# Patient Record
Sex: FEMALE | Race: WHITE | NOT HISPANIC OR LATINO | Employment: FULL TIME | URBAN - METROPOLITAN AREA
[De-identification: names, ages, dates, MRNs, and addresses within clinical notes are randomized per-mention and may not be internally consistent; named-entity substitution may affect disease eponyms.]

---

## 2017-04-17 ENCOUNTER — HOSPITAL ENCOUNTER (OUTPATIENT)
Dept: MAMMOGRAPHY | Facility: CLINIC | Age: 52
Discharge: HOME | End: 2017-04-17
Attending: FAMILY MEDICINE

## 2017-04-17 DIAGNOSIS — Z12.31 VISIT FOR SCREENING MAMMOGRAM: ICD-10-CM

## 2017-04-20 ENCOUNTER — OFFICE VISIT - HEALTHEAST (OUTPATIENT)
Dept: FAMILY MEDICINE | Facility: CLINIC | Age: 52
End: 2017-04-20

## 2017-04-20 DIAGNOSIS — E66.9 OBESITY, UNSPECIFIED: ICD-10-CM

## 2017-04-20 DIAGNOSIS — F41.9 ANXIETY: ICD-10-CM

## 2017-04-20 DIAGNOSIS — I10 HTN (HYPERTENSION): ICD-10-CM

## 2017-04-20 LAB — HBA1C MFR BLD: 5.6 % (ref 3.5–6.1)

## 2017-04-24 ENCOUNTER — COMMUNICATION - HEALTHEAST (OUTPATIENT)
Dept: FAMILY MEDICINE | Facility: CLINIC | Age: 52
End: 2017-04-24

## 2017-05-04 ENCOUNTER — AMBULATORY - HEALTHEAST (OUTPATIENT)
Dept: NURSING | Facility: CLINIC | Age: 52
End: 2017-05-04

## 2017-06-01 ENCOUNTER — AMBULATORY - HEALTHEAST (OUTPATIENT)
Dept: NURSING | Facility: CLINIC | Age: 52
End: 2017-06-01

## 2017-06-01 DIAGNOSIS — I10 HTN (HYPERTENSION): ICD-10-CM

## 2017-06-01 DIAGNOSIS — E66.9 OBESITY, UNSPECIFIED: ICD-10-CM

## 2017-06-29 ENCOUNTER — AMBULATORY - HEALTHEAST (OUTPATIENT)
Dept: NURSING | Facility: CLINIC | Age: 52
End: 2017-06-29

## 2017-07-27 ENCOUNTER — AMBULATORY - HEALTHEAST (OUTPATIENT)
Dept: NURSING | Facility: CLINIC | Age: 52
End: 2017-07-27

## 2017-08-31 ENCOUNTER — AMBULATORY - HEALTHEAST (OUTPATIENT)
Dept: NURSING | Facility: CLINIC | Age: 52
End: 2017-08-31

## 2017-12-20 ENCOUNTER — COMMUNICATION - HEALTHEAST (OUTPATIENT)
Dept: FAMILY MEDICINE | Facility: CLINIC | Age: 52
End: 2017-12-20

## 2017-12-20 DIAGNOSIS — I10 HTN (HYPERTENSION): ICD-10-CM

## 2017-12-20 DIAGNOSIS — E66.9 OBESITY: ICD-10-CM

## 2018-03-23 ENCOUNTER — COMMUNICATION - HEALTHEAST (OUTPATIENT)
Dept: FAMILY MEDICINE | Facility: CLINIC | Age: 53
End: 2018-03-23

## 2018-03-23 DIAGNOSIS — I10 HTN (HYPERTENSION): ICD-10-CM

## 2018-03-23 DIAGNOSIS — E66.9 OBESITY: ICD-10-CM

## 2018-04-05 ENCOUNTER — COMMUNICATION - HEALTHEAST (OUTPATIENT)
Dept: TELEHEALTH | Facility: CLINIC | Age: 53
End: 2018-04-05

## 2018-04-05 ENCOUNTER — OFFICE VISIT - HEALTHEAST (OUTPATIENT)
Dept: FAMILY MEDICINE | Facility: CLINIC | Age: 53
End: 2018-04-05

## 2018-04-05 DIAGNOSIS — I10 HTN (HYPERTENSION): ICD-10-CM

## 2018-04-05 DIAGNOSIS — R20.2 PARESTHESIA: ICD-10-CM

## 2018-04-05 DIAGNOSIS — Z00.00 ROUTINE GENERAL MEDICAL EXAMINATION AT A HEALTH CARE FACILITY: ICD-10-CM

## 2018-04-05 LAB
ALBUMIN SERPL-MCNC: 3.6 G/DL (ref 3.5–5)
ALP SERPL-CCNC: 92 U/L (ref 45–120)
ALT SERPL W P-5'-P-CCNC: 13 U/L (ref 0–45)
ANION GAP SERPL CALCULATED.3IONS-SCNC: 13 MMOL/L (ref 5–18)
AST SERPL W P-5'-P-CCNC: 14 U/L (ref 0–40)
BILIRUB SERPL-MCNC: 0.3 MG/DL (ref 0–1)
BUN SERPL-MCNC: 14 MG/DL (ref 8–22)
CALCIUM SERPL-MCNC: 10 MG/DL (ref 8.5–10.5)
CHLORIDE BLD-SCNC: 103 MMOL/L (ref 98–107)
CHOLEST SERPL-MCNC: 191 MG/DL
CO2 SERPL-SCNC: 24 MMOL/L (ref 22–31)
CREAT SERPL-MCNC: 0.77 MG/DL (ref 0.6–1.1)
FASTING STATUS PATIENT QL REPORTED: YES
GFR SERPL CREATININE-BSD FRML MDRD: >60 ML/MIN/1.73M2
GLUCOSE BLD-MCNC: 81 MG/DL (ref 70–125)
HDLC SERPL-MCNC: 65 MG/DL
LDLC SERPL CALC-MCNC: 113 MG/DL
POTASSIUM BLD-SCNC: 3.9 MMOL/L (ref 3.5–5)
PROT SERPL-MCNC: 7.5 G/DL (ref 6–8)
SODIUM SERPL-SCNC: 140 MMOL/L (ref 136–145)
TRIGL SERPL-MCNC: 63 MG/DL
TSH SERPL DL<=0.005 MIU/L-ACNC: 1.16 UIU/ML (ref 0.3–5)
VIT B12 SERPL-MCNC: 526 PG/ML (ref 213–816)

## 2018-04-05 ASSESSMENT — MIFFLIN-ST. JEOR: SCORE: 1680.64

## 2018-04-23 ENCOUNTER — HOSPITAL ENCOUNTER (OUTPATIENT)
Dept: MAMMOGRAPHY | Facility: CLINIC | Age: 53
Discharge: HOME | End: 2018-04-23
Attending: FAMILY MEDICINE

## 2018-04-23 DIAGNOSIS — Z12.31 VISIT FOR SCREENING MAMMOGRAM: ICD-10-CM

## 2018-07-23 ENCOUNTER — COMMUNICATION - HEALTHEAST (OUTPATIENT)
Dept: FAMILY MEDICINE | Facility: CLINIC | Age: 53
End: 2018-07-23

## 2018-07-23 DIAGNOSIS — E66.9 OBESITY: ICD-10-CM

## 2018-07-23 DIAGNOSIS — I10 HTN (HYPERTENSION): ICD-10-CM

## 2018-10-04 ENCOUNTER — AMBULATORY - HEALTHEAST (OUTPATIENT)
Dept: NURSING | Facility: CLINIC | Age: 53
End: 2018-10-04

## 2018-10-08 ENCOUNTER — COMMUNICATION - HEALTHEAST (OUTPATIENT)
Dept: FAMILY MEDICINE | Facility: CLINIC | Age: 53
End: 2018-10-08

## 2018-10-08 DIAGNOSIS — I10 HTN (HYPERTENSION): ICD-10-CM

## 2018-10-08 DIAGNOSIS — E66.9 OBESITY: ICD-10-CM

## 2019-04-09 ENCOUNTER — OFFICE VISIT - HEALTHEAST (OUTPATIENT)
Dept: FAMILY MEDICINE | Facility: CLINIC | Age: 54
End: 2019-04-09

## 2019-04-09 DIAGNOSIS — N92.6 IRREGULAR MENSES: ICD-10-CM

## 2019-04-09 DIAGNOSIS — R53.83 FATIGUE, UNSPECIFIED TYPE: ICD-10-CM

## 2019-04-09 DIAGNOSIS — I10 ESSENTIAL HYPERTENSION: ICD-10-CM

## 2019-04-09 DIAGNOSIS — Z00.00 ROUTINE GENERAL MEDICAL EXAMINATION AT A HEALTH CARE FACILITY: ICD-10-CM

## 2019-04-09 LAB
ALBUMIN SERPL-MCNC: 3.9 G/DL (ref 3.5–5)
ALP SERPL-CCNC: 74 U/L (ref 45–120)
ALT SERPL W P-5'-P-CCNC: 21 U/L (ref 0–45)
ANION GAP SERPL CALCULATED.3IONS-SCNC: 13 MMOL/L (ref 5–18)
AST SERPL W P-5'-P-CCNC: 21 U/L (ref 0–40)
BILIRUB SERPL-MCNC: 0.3 MG/DL (ref 0–1)
BUN SERPL-MCNC: 14 MG/DL (ref 8–22)
CALCIUM SERPL-MCNC: 10 MG/DL (ref 8.5–10.5)
CHLORIDE BLD-SCNC: 105 MMOL/L (ref 98–107)
CHOLEST SERPL-MCNC: 234 MG/DL
CO2 SERPL-SCNC: 23 MMOL/L (ref 22–31)
CREAT SERPL-MCNC: 0.71 MG/DL (ref 0.6–1.1)
ERYTHROCYTE [DISTWIDTH] IN BLOOD BY AUTOMATED COUNT: 13.3 % (ref 11–14.5)
FASTING STATUS PATIENT QL REPORTED: YES
GFR SERPL CREATININE-BSD FRML MDRD: >60 ML/MIN/1.73M2
GLUCOSE BLD-MCNC: 81 MG/DL (ref 70–125)
HCT VFR BLD AUTO: 45.4 % (ref 35–47)
HDLC SERPL-MCNC: 85 MG/DL
HGB BLD-MCNC: 15.1 G/DL (ref 12–16)
LDLC SERPL CALC-MCNC: 135 MG/DL
MCH RBC QN AUTO: 28.7 PG (ref 27–34)
MCHC RBC AUTO-ENTMCNC: 33.3 G/DL (ref 32–36)
MCV RBC AUTO: 86 FL (ref 80–100)
PLATELET # BLD AUTO: 300 THOU/UL (ref 140–440)
PMV BLD AUTO: 7.2 FL (ref 7–10)
POTASSIUM BLD-SCNC: 4.1 MMOL/L (ref 3.5–5)
PROT SERPL-MCNC: 7.2 G/DL (ref 6–8)
RBC # BLD AUTO: 5.26 MILL/UL (ref 3.8–5.4)
SODIUM SERPL-SCNC: 141 MMOL/L (ref 136–145)
T4 FREE SERPL-MCNC: 0.9 NG/DL (ref 0.7–1.8)
TRIGL SERPL-MCNC: 70 MG/DL
TSH SERPL DL<=0.005 MIU/L-ACNC: 1.58 UIU/ML (ref 0.3–5)
WBC: 6.1 THOU/UL (ref 4–11)

## 2019-04-09 ASSESSMENT — MIFFLIN-ST. JEOR: SCORE: 1562.71

## 2019-04-10 LAB
25(OH)D3 SERPL-MCNC: 43 NG/ML (ref 30–80)
HPV SOURCE: NORMAL
HUMAN PAPILLOMA VIRUS 16 DNA: NEGATIVE
HUMAN PAPILLOMA VIRUS 18 DNA: NEGATIVE
HUMAN PAPILLOMA VIRUS FINAL DIAGNOSIS: NORMAL
HUMAN PAPILLOMA VIRUS OTHER HR: NEGATIVE
SPECIMEN DESCRIPTION: NORMAL

## 2019-04-15 ENCOUNTER — COMMUNICATION - HEALTHEAST (OUTPATIENT)
Dept: FAMILY MEDICINE | Facility: CLINIC | Age: 54
End: 2019-04-15

## 2019-04-15 LAB
BKR LAB AP ABNORMAL BLEEDING: NO
BKR LAB AP BIRTH CONTROL/HORMONES: NORMAL
BKR LAB AP CERVICAL APPEARANCE: NORMAL
BKR LAB AP GYN ADEQUACY: NORMAL
BKR LAB AP GYN INTERPRETATION: NORMAL
BKR LAB AP HPV REFLEX: NORMAL
BKR LAB AP LMP: NORMAL
BKR LAB AP PATIENT STATUS: NORMAL
BKR LAB AP PREVIOUS ABNORMAL: NORMAL
BKR LAB AP PREVIOUS NORMAL: 2014
HIGH RISK?: NO
PATH REPORT.COMMENTS IMP SPEC: NORMAL
RESULT FLAG (HE HISTORICAL CONVERSION): NORMAL

## 2019-05-06 ENCOUNTER — HOSPITAL ENCOUNTER (OUTPATIENT)
Dept: ULTRASOUND IMAGING | Facility: CLINIC | Age: 54
Discharge: HOME | End: 2019-05-06
Attending: FAMILY MEDICINE

## 2019-05-06 ENCOUNTER — HOSPITAL ENCOUNTER (OUTPATIENT)
Dept: MAMMOGRAPHY | Facility: CLINIC | Age: 54
Discharge: HOME | End: 2019-05-06
Attending: FAMILY MEDICINE

## 2019-05-06 DIAGNOSIS — Z12.31 VISIT FOR SCREENING MAMMOGRAM: ICD-10-CM

## 2019-05-06 DIAGNOSIS — N92.6 IRREGULAR MENSES: ICD-10-CM

## 2019-05-07 ENCOUNTER — COMMUNICATION - HEALTHEAST (OUTPATIENT)
Dept: FAMILY MEDICINE | Facility: CLINIC | Age: 54
End: 2019-05-07

## 2019-05-09 ENCOUNTER — OFFICE VISIT - HEALTHEAST (OUTPATIENT)
Dept: FAMILY MEDICINE | Facility: CLINIC | Age: 54
End: 2019-05-09

## 2019-05-09 DIAGNOSIS — E66.01 MORBID OBESITY (H): ICD-10-CM

## 2019-05-09 DIAGNOSIS — I10 ESSENTIAL HYPERTENSION: ICD-10-CM

## 2019-10-09 ENCOUNTER — RECORDS - HEALTHEAST (OUTPATIENT)
Dept: ADMINISTRATIVE | Facility: OTHER | Age: 54
End: 2019-10-09

## 2020-03-05 ENCOUNTER — HOSPITAL ENCOUNTER (OUTPATIENT)
Dept: MAMMOGRAPHY | Facility: CLINIC | Age: 55
Discharge: HOME | End: 2020-03-05
Attending: FAMILY MEDICINE

## 2020-03-05 DIAGNOSIS — Z12.31 VISIT FOR SCREENING MAMMOGRAM: ICD-10-CM

## 2021-01-20 ENCOUNTER — OFFICE VISIT - HEALTHEAST (OUTPATIENT)
Dept: FAMILY MEDICINE | Facility: CLINIC | Age: 56
End: 2021-01-20

## 2021-01-20 DIAGNOSIS — I10 ESSENTIAL HYPERTENSION: ICD-10-CM

## 2021-01-20 RX ORDER — HYDROCHLOROTHIAZIDE 12.5 MG/1
CAPSULE ORAL
Qty: 90 CAPSULE | Refills: 3 | Status: SHIPPED | OUTPATIENT
Start: 2021-01-20 | End: 2021-11-03

## 2021-03-26 ENCOUNTER — OFFICE VISIT - HEALTHEAST (OUTPATIENT)
Dept: FAMILY MEDICINE | Facility: CLINIC | Age: 56
End: 2021-03-26

## 2021-03-26 DIAGNOSIS — R05.9 COUGH: ICD-10-CM

## 2021-03-26 DIAGNOSIS — R09.82 POST-NASAL DRIP: ICD-10-CM

## 2021-03-26 RX ORDER — BENZONATATE 100 MG/1
200 CAPSULE ORAL 3 TIMES DAILY PRN
Qty: 30 CAPSULE | Refills: 0 | Status: SHIPPED | OUTPATIENT
Start: 2021-03-26 | End: 2021-11-03

## 2021-03-26 ASSESSMENT — MIFFLIN-ST. JEOR: SCORE: 1756.17

## 2021-04-22 ENCOUNTER — HOSPITAL ENCOUNTER (OUTPATIENT)
Dept: MAMMOGRAPHY | Facility: CLINIC | Age: 56
Discharge: HOME | End: 2021-04-22
Attending: FAMILY MEDICINE

## 2021-04-22 DIAGNOSIS — Z12.31 VISIT FOR SCREENING MAMMOGRAM: ICD-10-CM

## 2021-05-26 ENCOUNTER — RECORDS - HEALTHEAST (OUTPATIENT)
Dept: ADMINISTRATIVE | Facility: CLINIC | Age: 56
End: 2021-05-26

## 2021-05-28 ENCOUNTER — RECORDS - HEALTHEAST (OUTPATIENT)
Dept: ADMINISTRATIVE | Facility: CLINIC | Age: 56
End: 2021-05-28

## 2021-05-28 NOTE — TELEPHONE ENCOUNTER
Left message to call back for: Geetha  Information to relay to patient:      ----- Message from Tom Mendoza MD sent at 5/7/2019  9:22 AM CDT -----  Please inform the patient that pelvic ultrasound was normal.  Thank you

## 2021-05-28 NOTE — TELEPHONE ENCOUNTER
Patient Returning Call  Reason for call:  Message from clinic  Information relayed to patient: Message from Tom Mendoza MD sent at 5/7/2019  9:22 AM CDT -----  Please inform the patient that pelvic ultrasound was normal.  Thank you  Patient has additional questions:  No  If YES, what are your questions/concerns:  n/a  Okay to leave a detailed message?: No call back needed

## 2021-05-28 NOTE — PROGRESS NOTES
ASSESSMENT/PLAN:  1. Essential hypertension  Start lisinopril 10mg.  She will come back for a nurse only BP check (because of co-pay cost)  Low sodium, daily exercise, and healthy eating  F/u in the clinic in 6 months  - lisinopril (PRINIVIL,ZESTRIL) 10 MG tablet; Take 1 tablet (10 mg total) by mouth daily.  Dispense: 90 tablet; Refill: 0    2. Morbid obesity (H)  Counseled healthy lifestyle modifications     CHIEF COMPLAINT:  Chief Complaint   Patient presents with     Hypertension     BP check     discuss US and mammo       HISTORY OF PRESENT ILLNESS:  Geetha is a 54 y.o. female presenting to the clinic today for hypertension follow up. She discontinued HCTZ due to urinary frequency. She does not check her blood pressure at home. Her BP today is 140/78.     REVIEW OF SYSTEMS:   Constitutional: Negative.   HENT: Negative.   Eyes: Negative.   Respiratory: Negative.   Cardiovascular: Negative.   Gastrointestinal: Negative.   Endocrine: Negative.   Genitourinary: Negative.   Musculoskeletal: Negative.   Skin: Negative.   Allergic/Immunologic: Negative.   Neurological: Negative.   Hematological: Negative.   Psychiatric/Behavioral: Negative.   All other systems are negative.    PFSH:  Reviewed as below.     TOBACCO USE:  Social History     Tobacco Use   Smoking Status Never Smoker   Smokeless Tobacco Never Used       VITALS:  Vitals:    05/09/19 1028   BP: 140/78   Patient Site: Left Arm   Patient Position: Sitting   Cuff Size: Adult Regular   Pulse: 68   Weight: (!) 228 lb 1 oz (103.4 kg)     Wt Readings from Last 3 Encounters:   05/09/19 (!) 228 lb 1 oz (103.4 kg)   04/09/19 221 lb 4 oz (100.4 kg)   04/05/18 (!) 246 lb 6 oz (111.8 kg)       PHYSICAL EXAM:  Constitutional: Patient is oriented to person, place, and time. Patient appears well-developed and well-nourished. No distress.   Cardiovascular: Normal rate.  Pulmonary/Chest: Effort normal. No respiratory distress.   Neurological: Patient is alert and oriented to  person, place, and time.      DATA REVIEWED:  ADDITIONAL HISTORY SUMMARIZED (2): None.   DECISION TO OBTAIN EXTRA INFORMATION (1): None.   RADIOLOGY TESTS (1): None.  LABS (1): None.  MEDICINE TESTS (1): None.  INDEPENDENT REVIEW (2 each): None.       The visit lasted a total of 10 minutes face to face with the patient. Over 50% of the time was spent counseling and educating the patient about blood pressure recheck.     IKoki, am scribing for and in the presence of Dr. Browne.  ITom MD , personally performed the services described in this documentation, as scribed by Koki Guzman in my presence, and it is both accurate and complete.       MEDICATIONS:  Current Outpatient Medications   Medication Sig Dispense Refill     lisinopril (PRINIVIL,ZESTRIL) 10 MG tablet Take 1 tablet (10 mg total) by mouth daily. 90 tablet 0     No current facility-administered medications for this visit.         Total data points: 0

## 2021-05-29 ENCOUNTER — RECORDS - HEALTHEAST (OUTPATIENT)
Dept: ADMINISTRATIVE | Facility: CLINIC | Age: 56
End: 2021-05-29

## 2021-05-30 ENCOUNTER — RECORDS - HEALTHEAST (OUTPATIENT)
Dept: ADMINISTRATIVE | Facility: CLINIC | Age: 56
End: 2021-05-30

## 2021-05-30 VITALS — BODY MASS INDEX: 44.72 KG/M2 | WEIGHT: 252.44 LBS

## 2021-06-01 VITALS — BODY MASS INDEX: 43.66 KG/M2 | HEIGHT: 63 IN | WEIGHT: 246.38 LBS

## 2021-06-02 VITALS — WEIGHT: 221.25 LBS | HEIGHT: 63 IN | BODY MASS INDEX: 39.2 KG/M2

## 2021-06-03 VITALS — WEIGHT: 228.06 LBS | BODY MASS INDEX: 40.4 KG/M2

## 2021-06-05 VITALS
RESPIRATION RATE: 17 BRPM | SYSTOLIC BLOOD PRESSURE: 130 MMHG | OXYGEN SATURATION: 100 % | TEMPERATURE: 97.6 F | HEART RATE: 89 BPM | WEIGHT: 263.9 LBS | DIASTOLIC BLOOD PRESSURE: 80 MMHG | HEIGHT: 63 IN | BODY MASS INDEX: 46.76 KG/M2

## 2021-06-10 NOTE — PROGRESS NOTES
ASSESSMENT/PLAN:  1. HTN (hypertension)  52-year-old female with a new diagnosis of hypertension.  I will start her on hydrochlorothiazide 12.5 mg.  We spoke about side effects of hydrochlorothiazide.  We also spoke about complications of uncontrolled hypertension.  I counseled her on lifestyle modification, sodium restriction, weight reduction, physical activity.  She will come back in 2 weeks for nurse only blood pressure check.  - Comprehensive Metabolic Panel  - HM2(CBC w/o Differential)  - Glycosylated Hemoglobin A1c  - Thyroid Stimulating Hormone (TSH)  - hydroCHLOROthiazide (MICROZIDE) 12.5 mg capsule; Take 1 capsule (12.5 mg total) by mouth daily.  Dispense: 30 capsule; Refill: 1    2. Obesity  Counseled    3. Anxiety  Not currently on medication at this point.  We briefly spoke about this.  She will continue with coping mechanism.  She may benefit from SSRI and psychotherapy.        Orders Placed This Encounter   Procedures     Comprehensive Metabolic Panel     HM2(CBC w/o Differential)     Glycosylated Hemoglobin A1c     Thyroid Stimulating Hormone (TSH)           CHIEF COMPLAINT:  Chief Complaint   Patient presents with     BP check     High BP today from 150/90       HISTORY OF PRESENT ILLNESS:  Geetha is a 52 y.o. female presenting to the clinic today for concerns for high blood pressure. Here with her son's girlfriend. Her blood pressure today is elevated today at 158/88. She has never been told that she has blood pressure. 2 weeks ago, she went into the [facility] because she was coughing and had some associated chest pain from her cough. Her blood pressure at the [facility] was very elevated with systolic of 195. She received Tessalon Perles and albuterol for her cough. She was told to follow up with her regular doctor when she was feeling better.    She does have a wrist cuff to check her pressure, and she checked it today, and it was high. She does say that she is somewhat anxious, and  this could be driving her blood pressure higher. She also feels like weight and lifestyle have been contributing to her blood pressure because she has gain weight in the past 6 months. 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 She feels like she has been having some anxiety related chest discomfort.     Health Maintenance: She recently had her mammogram. Her colonoscopy is up to date.     REVIEW OF SYSTEMS:   She has been using some essential oils for vertigo. Denies constipation. All other systems are negative.    PFSH:  Family history of son with hypertension. Family history of thyroid dysfunction in mother. No family history of diabetes.     TOBACCO USE:  History   Smoking Status     Never Smoker   Smokeless Tobacco     Not on file       VITALS:  Vitals:    04/20/17 1530 04/20/17 1608   BP: 156/88 150/90   Patient Site: Left Arm    Patient Position: Sitting    Cuff Size: Adult Large    Pulse: 88    Weight: (!) 252 lb 7 oz (114.5 kg)      Wt Readings from Last 3 Encounters:   04/20/17 (!) 252 lb 7 oz (114.5 kg)   04/23/15 (!) 224 lb 2 oz (101.7 kg)       PHYSICAL EXAM:  Constitutional: Patient is oriented to person, place, and time. Patient appears well-developed and well-nourished. No distress.   Cardiovascular: Normal rate, regular rhythm, normal heart sounds and intact distal pulses. No murmur heard.   Pulmonary/Chest: Effort normal and breath sounds normal. No stridor. No respiratory distress. Patient has no wheezes, no rales, exhibits no tenderness.    Neurological: Patient is alert and oriented to person, place, and time. Patient has normal reflexes. No cranial nerve deficit. Coordination normal.   Skin: Skin is warm and dry. No rash noted. Patient is not diaphoretic. No erythema. No pallor.        Results for orders placed or performed in visit on 04/20/17   HM2(CBC w/o Differential)   Result Value Ref Range    WBC 9.0 4.0 - 11.0 thou/uL    RBC 5.11 3.80 -  5.40 mill/uL    Hemoglobin 14.5 12.0 - 16.0 g/dL    Hematocrit 43.2 35.0 - 47.0 %    MCV 84 80 - 100 fL    MCH 28.3 27.0 - 34.0 pg    MCHC 33.5 32.0 - 36.0 g/dL    RDW 13.1 11.0 - 14.5 %    Platelets 336 140 - 440 thou/uL    MPV 6.9 (L) 7.0 - 10.0 fL   Glycosylated Hemoglobin A1c   Result Value Ref Range    Hemoglobin A1c 5.6 3.5 - 6.1 %       QUALITY MEASURES:  The following high BMI interventions were performed this visit: encouragement to exercise and lifestyle education regarding diet    ADDITIONAL HISTORY SUMMARIZED (2): None.  DECISION TO OBTAIN EXTRA INFORMATION (1): None.   RADIOLOGY TESTS (1): None.  LABS (1): Ordered labs today.  MEDICINE TESTS (1): None.  INDEPENDENT REVIEW (2 each): None.     The visit lasted a total of 25 minutes face to face with the patient. Over 50% of the time was spent counseling and educating the patient about hypertension management.    IMicki, am scribing for and in the presence of, Dr. Browne.    IDr. Browne, personally performed the services described in this documentation, as scribed by Micki Nixon in my presence, and it is both accurate and complete.    MEDICATIONS:  Current Outpatient Prescriptions   Medication Sig Dispense Refill     hydroCHLOROthiazide (MICROZIDE) 12.5 mg capsule Take 1 capsule (12.5 mg total) by mouth daily. 30 capsule 1     No current facility-administered medications for this visit.        Total data points: 1

## 2021-06-10 NOTE — PROGRESS NOTES
I met with Geetha Connolly at the request of Dr Browne to recheck her blood pressure.  Blood pressure medications on the MAR were reviewed with patient.    Patient has taken all medications as per usual regimen: Yes  Patient reports tolerating them without any issues or concerns: Yes    Vitals:    05/04/17 1028 05/04/17 1039   BP: 140/90 136/88   Patient Site: Left Arm Left Arm   Patient Position: Sitting Sitting   Cuff Size: Adult Large Adult Large   Pulse: 88 72       After 5 minutes, the patient's blood pressure remained > 140/90.    Is the patient currently having any chest pain?  No  Does the patient currently have a headache?   No  Does the patient currently have any vision changes? No  Does the patient currently have any nausea? No  Does the patient currently have any abdominal pain? No    Information was reported to Dr Browne.

## 2021-06-14 NOTE — PROGRESS NOTES
Geetha Connolly is a 55 y.o. female who is being evaluated via a billable video visit.      How would you like to obtain your AVS? MyChart.    Will anyone else be joining your video visit? No      Video Start Time: 10:33 AM  Assessment & Plan     Geetha was seen today for med check and medication refill.    Diagnoses and all orders for this visit:    Essential hypertension  -     hydroCHLOROthiazide (MICROZIDE) 12.5 mg capsule; TK 1 C PO D  Refilled  Counseled healthy lifestyle modifications  F/u yearly      Yaniraua Dianne Mendoza MD  [facility]    Subjective     Geetha Connolly is 55 y.o. and presents to clinic today for the following health issues   HPI     Here for PB check  128/77 this morning  No chest pain or shortness of breath  Afraid to come to the clinic because of fear of getting COVID19  Overall doing well        Objective       Vitals:  No vitals were obtained today due to virtual visit.    Physical Exam  Constitutional: Patient is oriented to person, place, and time. Patient appears well-developed and well-nourished. No distress.   Head: Normocephalic and atraumatic.   Right Ear: External ear normal.   Left Ear: External ear normal.   Nose: Nose normal.   Eyes: Conjunctivae and EOM are normal. Right eye exhibits no discharge. Left eye exhibits no discharge. No scleral icterus.   Neurological: Patient is alert and oriented to person, place, and time. No cranial nerve deficit. Coordination normal.   Skin: No rash noted. Patient is not diaphoretic. No erythema. No pallor.     Video-Visit Details    Type of service:  Video Visit    Video End Time (time video stopped): 10:34 AM  Originating Location (pt. Location): Home    Distant Location (provider location):  [facility]     Platform used for Video Visit: [employer]

## 2021-06-16 PROBLEM — I10 HTN (HYPERTENSION): Status: ACTIVE | Noted: 2018-04-05

## 2021-06-16 PROBLEM — E66.01 MORBID OBESITY (H): Status: ACTIVE | Noted: 2019-05-09

## 2021-06-16 NOTE — PROGRESS NOTES
ASSESSMENT:  1. Cough  - benzonatate (TESSALON PERLES) 100 MG capsule; Take 2 capsules (200 mg total) by mouth 3 (three) times a day as needed for cough.  Dispense: 30 capsule; Refill: 0    2. Post-nasal drip    PLAN / MDM:  I think that she does have a postnasal drainage.  I did not really notice any major abnormal findings on her exam.  We will have to cover treated with symptom control medications and have her follow-up if not improved.    No orders of the defined types were placed in this encounter.    There are no discontinued medications.    No follow-ups on file.      CHIEF COMPLAINT:  Chief Complaint   Patient presents with     Cough     chest pain with cough and SOB       HISTORY OF PRESENT ILLNESS:  Geetha is a 56 y.o. female presenting to the clinic today she has been having cough that was suggested about a week ago.  Cough is said to be slightly worse in the morning and also productive.  She noted mild cough throughout the day increase she is unable to produce a lot of phlegm.  She feels some pressure at the middle of her upper chest.  Cough is also worse when she is laying down in a couch relaxing.  She noted that when she is sleeping it is not problematic for her because she uses a CPAP machine.  She has had no fevers, she has had no chills.  She has some runny nose, no ear pain and feeling slightly sore throat.  She has no prior exposure to somebody with any URI.        REVIEW OF SYSTEMS:   She does not wheeze.  She does not have any difficulty breathing.  Rest of the review of systems as in the history.  All other systems are negative.    PFSH:  Reviewed, as below.    Social History     Tobacco Use   Smoking Status Never Smoker   Smokeless Tobacco Never Used       Family History   Problem Relation Age of Onset     Breast cancer Mother 70       Social History     Socioeconomic History     Marital status: Single     Spouse name: Not on file     Number of children: Not on file     Years of education: Not  on file     Highest education level: Not on file   Occupational History     Not on file   Social Needs     Financial resource strain: Not on file     Food insecurity     Worry: Not on file     Inability: Not on file     Transportation needs     Medical: Not on file     Non-medical: Not on file   Tobacco Use     Smoking status: Never Smoker     Smokeless tobacco: Never Used   Substance and Sexual Activity     Alcohol use: Not on file     Drug use: Not on file     Sexual activity: Not on file   Lifestyle     Physical activity     Days per week: Not on file     Minutes per session: Not on file     Stress: Not on file   Relationships     Social connections     Talks on phone: Not on file     Gets together: Not on file     Attends [employer] service: Not on file     Active member of club or organization: Not on file     Attends meetings of clubs or organizations: Not on file     Relationship status: Not on file     Intimate partner violence     Fear of current or ex partner: Not on file     Emotionally abused: Not on file     Physically abused: Not on file     Forced sexual activity: Not on file   Other Topics Concern     Not on file   Social History Narrative     Not on file       Past Surgical History:   Procedure Laterality Date     ID REMOVAL GALLBLADDER      Description: Cholecystectomy;  Recorded: 03/23/2011;  Comments: ~2003       No Known Allergies    Active Ambulatory Problems     Diagnosis Date Noted     HTN (hypertension) 04/05/2018     Morbid obesity (H) 05/09/2019     Resolved Ambulatory Problems     Diagnosis Date Noted     No Resolved Ambulatory Problems     No Additional Past Medical History       Current Outpatient Medications   Medication Sig Dispense Refill     CALCIUM-MAGNESIUM ORAL        cholecalciferol, vitamin D3, 5,000 unit Tab Take by mouth.       hydroCHLOROthiazide (MICROZIDE) 12.5 mg capsule TK 1 C PO D 90 capsule 3     OMEGA-3 FATTY ACIDS-FISH OIL ORAL Take by mouth.       benzonatate  "(TESSALON PERLES) 100 MG capsule Take 2 capsules (200 mg total) by mouth 3 (three) times a day as needed for cough. 30 capsule 0     No current facility-administered medications for this visit.        VITALS:  Vitals:    03/26/21 1414   BP: 130/80   Patient Site: Left Arm   Patient Position: Sitting   Cuff Size: Adult Large   Pulse: 89   Resp: 17   Temp: 97.6  F (36.4  C)   TempSrc: Oral   SpO2: 100%   Weight: (!) 263 lb 14.4 oz (119.7 kg)   Height: 5' 3\" (1.6 m)     Wt Readings from Last 3 Encounters:   03/26/21 (!) 263 lb 14.4 oz (119.7 kg)   10/06/19 215 lb (97.5 kg)   05/09/19 (!) 228 lb 1 oz (103.4 kg)     Body mass index is 46.75 kg/m .    PHYSICAL EXAM:  General Appearance: Alert, cooperative, no distress, appears stated age  HEENT: Pupils are equal and reactive, extraocular motions is normal.  Oropharynx is moist.  Mild postnasal drainage.  Neck is supple no notable thyromegaly no lymphadenopathy..  External ears are normal.  Tympanic membrane was normal.  Lungs: Clear to auscultation bilaterally, respirations unlabored  Heart: Regular rhythm and normal rate,S1 and S2 normal, no murmur, rub, or gallop  Abdomen: Soft, nontender no palpable organs.  Psychiatric: Normal mood and affect.    MEDICATIONS:  Current Outpatient Medications   Medication Sig Dispense Refill     CALCIUM-MAGNESIUM ORAL        cholecalciferol, vitamin D3, 5,000 unit Tab Take by mouth.       hydroCHLOROthiazide (MICROZIDE) 12.5 mg capsule TK 1 C PO D 90 capsule 3     OMEGA-3 FATTY ACIDS-FISH OIL ORAL Take by mouth.       benzonatate (TESSALON PERLES) 100 MG capsule Take 2 capsules (200 mg total) by mouth 3 (three) times a day as needed for cough. 30 capsule 0     No current facility-administered medications for this visit.                  "

## 2021-06-17 NOTE — PROGRESS NOTES
ASSESSMENT/PLAN:  Routine general medical examination at a health care facility  We discussed healthy lifestyle, nutrition, cardiovascular risk reduction, self care, safety, sunscreen, and seatbelt use.  Health maintenance screening and immunizations reviewed with the patient.    HTN (hypertension)  Stable on HCTZ 12.5mg  Advised weight reduction and lifestyle modifications  She prefers nurse only BP recheck in 6 months rather than office visit due to insurance reason, which is ok with me  -     Lipid Cascade  -     Comprehensive Metabolic Panel    Paresthesia of fingers, chronic and unchanged  -     Thyroid Stimulating Hormone (TSH)  -     Vitamin B12        CHIEF COMPLAINT:  Chief Complaint   Patient presents with     Annual Exam     Px, Pt is fasting       SUBJECTIVE:  Geetha Connolly is a 53 y.o. female who is here for a health maintenance visit. Her blood pressure and pulse are within normal limits. Her BMI is 43. She is fasting today. Her PAP smear is up to date. She has her mammogram scheduled. Her colonoscopy is up to date. Her immunizations are up to date.     Paresthesia: She has noticed that her hands and feet can go numb. She mostly notices this at night, and the numbness can wake her from sleep. She does not feel this during the day much. She can have some numbness in her feet when she sits for a bowel movement. She has noticed that she can sleep with her hands under her hands, but is trying to sleep with the hands by her sides.      REVIEW OF SYSTEMS:   She continues the hydrochlorothiazide 12.5mg for her blood pressure, and tolerates this well. Hearing and vision are stable. She does wear reading glasses. Bowel and bladder functions are stable. She is still having monthly periods. She is working on healthy lifestyle, and is joining weight watchers. She does take a daily vitamin D supplement. All other systems are negative.    PFSH:  No new history.     History   Smoking Status     Never Smoker  "  Smokeless Tobacco     Never Used       Family History   Problem Relation Age of Onset     Breast cancer Mother 70       Past Surgical History:   Procedure Laterality Date     NM REMOVAL GALLBLADDER      Description: Cholecystectomy;  Recorded: 03/23/2011;  Comments: ~2003       No Known Allergies      OBJECTIVE:   Physical Exam   Vitals:    04/05/18 0901   BP: 130/84   Pulse: 76   Weight: (!) 246 lb 6 oz (111.8 kg)   Height: 5' 3.25\" (1.607 m)     Estimated body mass index is 43.3 kg/(m^2) as calculated from the following:    Height as of this encounter: 5' 3.25\" (1.607 m).    Weight as of this encounter: 246 lb 6 oz (111.8 kg).    Constitutional: Patient is oriented to person, place, and time. Patient appears well-developed and well-nourished. No distress.   Head: Normocephalic and atraumatic.   Right Ear: External ear normal. Ear canal and TM normal.   Left Ear: External ear normal. Ear canal and TM normal.   Nose: Nose normal.   Mouth/Throat: Oropharynx is clear and moist. No oropharyngeal exudate.   Eyes: Conjunctivae and EOM are normal. Pupils are equal, round, and reactive to light. Right eye exhibits no discharge. Left eye exhibits no discharge. No scleral icterus.   Neck: Neck supple. No JVD present. No tracheal deviation present. No thyromegaly present.   Breasts:  Normal appearing, no skin involvement, no palpable mass, no tenderness on palpation.  No axillary involvement  Cardiovascular: Normal rate, regular rhythm, normal heart sounds and intact distal pulses. No murmur heard.   Pulmonary/Chest: Effort normal and breath sounds normal. No stridor. No respiratory distress. Patient has no wheezes, no rales, exhibits no tenderness.   Abdominal: Soft. Bowel sounds are normal. Patient exhibits no distension and no mass. There is no tenderness. There is no rebound and no guarding.   Lymphadenopathy:  Patient has no cervical adenopathy.   Neurological: Patient is alert and oriented to person, place, and time. " Patient has normal reflexes. No cranial nerve deficit. Coordination normal.   Skin: Skin is warm and dry. No rash noted. Patient is not diaphoretic. No erythema. No pallor.   Psychiatric: Patient has good eye contact without any psychomotor retardation or stereotypic behaviors.  normal mood and affect. Judgment and thought content normal.   Speech is regular rate and rhythm.       QUALITY MEASURES:  The following high BMI interventions were performed this visit: encouragement to exercise and lifestyle education regarding diet    ADDITIONAL HISTORY SUMMARIZED (2): None.  DECISION TO OBTAIN EXTRA INFORMATION (1): None.   RADIOLOGY TESTS (1): None.  LABS (1): Ordered labs today.  MEDICINE TESTS (1): None.  INDEPENDENT REVIEW (2 each): None.     The visit lasted a total of 15 minutes face to face with the patient. Over 50% of the time was spent counseling and educating the patient about health maintenance and paresthesias.    IMicki, am scribing for and in the presence of, Dr. Browne.    ITom MD , personally performed the services described in this documentation, as scribed by Micki Nixon in my presence, and it is both accurate and complete.      MEDICATIONS:  Current Outpatient Prescriptions   Medication Sig Dispense Refill     hydroCHLOROthiazide (MICROZIDE) 12.5 mg capsule TAKE 1 CAPSULE(12.5 MG) BY MOUTH DAILY 90 capsule 0     No current facility-administered medications for this visit.          Total data points: 1

## 2021-06-19 NOTE — LETTER
Letter by Tom Osei MD at      Author: Tom Osei MD Service: -- Author Type: --    Filed:  Encounter Date: 4/15/2019 Status: (Other)         Geetha Connolly  [address] [facility] [address]             April 15, 2019         Dear Ms. Connolly,    Below are the results from your recent visit:    Resulted Orders   Lipid Cascade   Result Value Ref Range    Cholesterol 234 (H) <=199 mg/dL    Triglycerides 70 <=149 mg/dL    HDL Cholesterol 85 >=50 mg/dL    LDL Calculated 135 (H) <=129 mg/dL    Patient Fasting > 8hrs? Yes    Comprehensive Metabolic Panel   Result Value Ref Range    Sodium 141 136 - 145 mmol/L    Potassium 4.1 3.5 - 5.0 mmol/L    Chloride 105 98 - 107 mmol/L    CO2 23 22 - 31 mmol/L    Anion Gap, Calculation 13 5 - 18 mmol/L    Glucose 81 70 - 125 mg/dL    BUN 14 8 - 22 mg/dL    Creatinine 0.71 0.60 - 1.10 mg/dL    GFR MDRD Af Amer >60 >60 mL/min/1.73m2    GFR MDRD Non Af Amer >60 >60 mL/min/1.73m2    Bilirubin, Total 0.3 0.0 - 1.0 mg/dL    Calcium 10.0 8.5 - 10.5 mg/dL    Protein, Total 7.2 6.0 - 8.0 g/dL    Albumin 3.9 3.5 - 5.0 g/dL    Alkaline Phosphatase 74 45 - 120 U/L    AST 21 0 - 40 U/L    ALT 21 0 - 45 U/L    Narrative    Fasting Glucose reference range is 70-99 mg/dL per  American Diabetes Association (ADA) guidelines.   Gynecologic Cytology (PAP Smear)   Result Value Ref Range    Interpretation  Negative for squamous intraepithelial lesion or malignancy.      Negative for squamous intraepithelial lesion or malignancy   Thyroid Stimulating Hormone (TSH)   Result Value Ref Range    TSH 1.58 0.30 - 5.00 uIU/mL   T4, Free   Result Value Ref Range    Free T4 0.9 0.7 - 1.8 ng/dL   Vitamin D, Total (25-Hydroxy)   Result Value Ref Range    Vitamin D, Total (25-Hydroxy) 43.0 30.0 - 80.0 ng/mL   HM2(CBC w/o Differential)   Result Value Ref Range    WBC 6.1 4.0 - 11.0 thou/uL    RBC 5.26 3.80 - 5.40 mill/uL    Hemoglobin 15.1 12.0 - 16.0 g/dL    Hematocrit 45.4 35.0 -  47.0 %    MCV 86 80 - 100 fL    MCH 28.7 27.0 - 34.0 pg    MCHC 33.3 32.0 - 36.0 g/dL    RDW 13.3 11.0 - 14.5 %    Platelets 300 140 - 440 thou/uL    MPV 7.2 7.0 - 10.0 fL   HPV High Risk DNA Cervical   Result Value Ref Range    HPV Source SurePath     HPV16 DNA Negative NEG    HPV18 DNA Negative NEG    Other HR HPV Negative NEG       Cholesterol, white blood cells, red blood cells, hemoglobin, platelets, thyroid, electrolytes, kidneys, liver functions, vitamin D, and pap smear are all normal.       Please call with questions or contact us using [email or web address].    Sincerely,        Electronically signed by Tom Mendoza MD

## 2021-06-20 NOTE — PROGRESS NOTES
I met with Geetha Connolly at the request of  to recheck her blood pressure.  Blood pressure medications on the MAR were reviewed with patient.    Patient has taken all medications as per usual regimen: Yes  Patient reports tolerating them without any issues or concerns: No    Vitals:    10/04/18 1009   BP: 120/78   Patient Site: Left Arm   Patient Position: Sitting   Cuff Size: Adult Large   Pulse: 64       Blood pressure was taken, previous encounter was reviewed, recorded blood pressure below 140/90.  Patient was discharged and the note will be sent to the provider for final review.

## 2021-06-26 ENCOUNTER — HEALTH MAINTENANCE LETTER (OUTPATIENT)
Age: 56
End: 2021-06-26

## 2021-06-27 NOTE — PROGRESS NOTES
Progress Notes by Tom Osei MD at 4/9/2019 10:00 AM     Author: Tom Osei MD Service: -- Author Type: Physician    Filed: 4/9/2019 11:18 AM Encounter Date: 4/9/2019 Status: Signed    : Tom Osei MD (Physician)       FEMALE PREVENTATIVE EXAM    Assessment and Plan:     Routine general medical examination at a health care facility  We discussed healthy lifestyle, nutrition, cardiovascular risk reduction, self care, safety, sunscreen, seatbelt, and timing of cancer screening.  Health maintenance screening and immunizations reviewed with the patient.  -     Gynecologic Cytology (PAP Smear)    Essential hypertension  Off hydrochlorothiazide for 2 months now due to urinary frequency.  Blood pressure is adequate at this time.  Come back in 1 month for a recheck.  If needed then may want to consider lisinopril as an alternative  -     Lipid Cascade  -     Comprehensive Metabolic Panel    BMI 39.0-39.9,adult  Counseled healthy lifestyle modifications     Irregular menses  Likely perimenopause however will check thyroid and pelvic ultrasound  -     Thyroid Stimulating Hormone (TSH)  -     T4, Free  -     US Pelvis With Transvaginal Non OB; Future    Fatigue, unspecified type  -     Vitamin D, Total (25-Hydroxy)  -     HM2(CBC w/o Differential)  Thyroid function and pelvic US as described above      Next follow up:  Return in about 1 month (around 5/9/2019).    Immunization Review  Adult Imm Review: No immunizations due today    I discussed the following with the patient:   Adult Healthy Living: Importance of regular exercise  Healthy nutrition    Subjective:   Chief Complaint: Geetha Connolly is an 54 y.o. female here for a preventative health visit.     HPI:    irregular menstrual bleeding.  Present bleeding episode has been ongoing fr 2 weeks.  Light spotting but feeling fatigue.       Has been off HTCZ for 2 months due to urinary frequency    Going to weight  "watchers and lost about 20lbs    Healthy Habits  Are you taking a daily aspirin? No  Do you typically exercising at least 40 min, 3-4 times per week?  Yes  Do you usually eat at least 4 servings of fruit and vegetables a day, include whole grains and fiber and avoid regularly eating high fat foods? Yes  Have you had an eye exam in the past two years? NO  Do you see a dentist twice per year? Yes  Do you have any concerns regarding sleep? No    Safety Screen  If you own firearms, are they secured in a locked gun cabinet or with trigger locks? The patient does not own any firearms  No data recorded    Review of Systems:  Please see above.  The rest of the review of systems are negative for all systems.     Pap History:   Yes - updated in Problem List and Health Maintenance accordingly  Cancer Screening       Status Date      PAP SMEAR Overdue 4/1/2019      Done 4/1/2014 GYNECOLOGIC CYTOLOGY (PAP SMEAR)     Patient has more history with this topic...    MAMMOGRAM Next Due 4/23/2019      Done 4/23/2018 MAMMO SCREENING BILATERAL     Patient has more history with this topic...    COLONOSCOPY Next Due 8/7/2025      Done 8/7/2015 COLONOSCOPY EXTERNAL RESULT          Patient Care Team:  Tom Osei MD as PCP - General        History     Not marked as reviewed during this visit.            Objective:   Vital Signs:   Visit Vitals  /88   Pulse 76   Ht 5' 3\" (1.6 m)   Wt 221 lb 4 oz (100.4 kg)   LMP 03/22/2019   BMI 39.19 kg/m           PHYSICAL EXAM    Objective:    Physical Exam   Vitals:    04/09/19 1007   BP: 134/88   Pulse: 76      Constitutional: Patient is oriented to person, place, and time. Patient appears well-developed and well-nourished. No distress.   Head: Normocephalic and atraumatic.   Right Ear: External ear normal. Normal TM  Left Ear: External ear normal. Normal TM  Nose: Nose normal.   Mouth/Throat: Oropharynx is clear and moist. No oropharyngeal exudate.   Eyes: Conjunctivae and EOM " are normal. Pupils are equal, round, and reactive to light. Right eye exhibits no discharge. Left eye exhibits no discharge. No scleral icterus.   Neck: Neck supple. No JVD present. No tracheal deviation present. No thyromegaly present.   Cardiovascular: Normal rate, regular rhythm, normal heart sounds and intact distal pulses. No murmur heard.   Pulmonary/Chest: Effort normal and breath sounds normal. No stridor. No respiratory distress. Patient has no wheezes, no rales, exhibits no tenderness.   Abdominal: Soft. Bowel sounds are normal. Patient exhibits no distension and no mass. There is no tenderness. There is no rebound and no guarding.   Lymphadenopathy:  Patient has no cervical adenopathy.   Neurological: Patient is alert and oriented to person, place, and time. Patient has normal reflexes. No cranial nerve deficit. Coordination normal.   Skin: Skin is warm and dry. No rash noted. Patient is not diaphoretic. No erythema. No pallor.   Normal breast exam  Normal-appearing external genitalia.  There is bright red blood at the external loss.  No cervical mass.  No cervical motion tenderness.  No adnexal mass    The 10-year ASCVD risk score (Carlitos DC Jr., et al., 2013) is: 2.1%    Values used to calculate the score:      Age: 54 years      Sex: Female      Is Non- : No      Diabetic: No      Tobacco smoker: No      Systolic Blood Pressure: 134 mmHg      Is BP treated: Yes      HDL Cholesterol: 65 mg/dL      Total Cholesterol: 191 mg/dL         Medication List           Accurate as of 4/9/19 11:16 AM. If you have any questions, ask your nurse or doctor.               CONTINUE taking these medications    hydroCHLOROthiazide 12.5 mg capsule  Also known as:  MICROZIDE  INSTRUCTIONS:  TAKE 1 CAPSULE(12.5 MG) BY MOUTH DAILY               Additional Screenings Completed Today:

## 2021-07-03 NOTE — ADDENDUM NOTE
Addendum Note by Tom Poe MD at 6/1/2017  4:14 PM     Author: Tom Poe MD Service: -- Author Type: Physician    Filed: 6/1/2017  4:14 PM Encounter Date: 6/1/2017 Status: Signed    : Tom Poe MD (Physician)    Addended by: TOM POE on: 6/1/2017 04:14 PM        Modules accepted: Orders

## 2021-07-13 ENCOUNTER — RECORDS - HEALTHEAST (OUTPATIENT)
Dept: ADMINISTRATIVE | Facility: CLINIC | Age: 56
End: 2021-07-13

## 2021-07-21 ENCOUNTER — RECORDS - HEALTHEAST (OUTPATIENT)
Dept: ADMINISTRATIVE | Facility: CLINIC | Age: 56
End: 2021-07-21

## 2021-10-16 ENCOUNTER — HEALTH MAINTENANCE LETTER (OUTPATIENT)
Age: 56
End: 2021-10-16

## 2021-11-03 ENCOUNTER — OFFICE VISIT (OUTPATIENT)
Dept: FAMILY MEDICINE | Facility: CLINIC | Age: 56
End: 2021-11-03
Payer: COMMERCIAL

## 2021-11-03 VITALS
HEART RATE: 69 BPM | SYSTOLIC BLOOD PRESSURE: 120 MMHG | BODY MASS INDEX: 41.46 KG/M2 | OXYGEN SATURATION: 100 % | DIASTOLIC BLOOD PRESSURE: 84 MMHG | WEIGHT: 234 LBS | HEIGHT: 63 IN

## 2021-11-03 DIAGNOSIS — Z00.00 ROUTINE ADULT HEALTH MAINTENANCE: Primary | ICD-10-CM

## 2021-11-03 DIAGNOSIS — M25.561 CHRONIC PAIN OF RIGHT KNEE: ICD-10-CM

## 2021-11-03 DIAGNOSIS — I10 ESSENTIAL HYPERTENSION: ICD-10-CM

## 2021-11-03 DIAGNOSIS — G89.29 CHRONIC PAIN OF RIGHT KNEE: ICD-10-CM

## 2021-11-03 DIAGNOSIS — I10 PRIMARY HYPERTENSION: ICD-10-CM

## 2021-11-03 PROCEDURE — 99396 PREV VISIT EST AGE 40-64: CPT | Performed by: FAMILY MEDICINE

## 2021-11-03 PROCEDURE — 99214 OFFICE O/P EST MOD 30 MIN: CPT | Mod: 25 | Performed by: FAMILY MEDICINE

## 2021-11-03 PROCEDURE — 96127 BRIEF EMOTIONAL/BEHAV ASSMT: CPT | Performed by: FAMILY MEDICINE

## 2021-11-03 RX ORDER — HYDROCHLOROTHIAZIDE 12.5 MG/1
CAPSULE ORAL
Qty: 90 CAPSULE | Refills: 3 | Status: SHIPPED | OUTPATIENT
Start: 2021-11-03 | End: 2022-05-04

## 2021-11-03 ASSESSMENT — MIFFLIN-ST. JEOR: SCORE: 1620.55

## 2021-11-03 NOTE — PROGRESS NOTES
SUBJECTIVE:   CC: Geetha Connolly is an 56 year old woman who presents for preventive health visit.       Patient has been advised of split billing requirements and indicates understanding: Yes  Healthy Habits:     Getting at least 3 servings of Calcium per day:  Yes    Bi-annual eye exam:  NO    Dental care twice a year:  Yes    Sleep apnea or symptoms of sleep apnea:  Sleep apnea    Diet:  Regular (no restrictions)    Frequency of exercise:  2-3 days/week    Duration of exercise:  30-45 minutes    Taking medications regularly:  Yes    Medication side effects:  None    PHQ-2 Total Score: 0    Additional concerns today:  Yes    Ability to successfully perform activities of daily living: Yes, no assistance needed  Home safety:  none identified     Today's PHQ-2 Score:   PHQ-2 ( 1999 [employer]) 11/3/2021   Q1: Little interest or pleasure in doing things 0   Q2: Feeling down, depressed or hopeless 0   PHQ-2 Score 0   Q1: Little interest or pleasure in doing things Not at all   Q2: Feeling down, depressed or hopeless Not at all   PHQ-2 Score 0       Abuse: Current or Past (Physical, Sexual or Emotional) - No  Do you feel safe in your environment? Yes    Social History     Tobacco Use     Smoking status: Never Smoker     Smokeless tobacco: Never Used   Substance Use Topics     Alcohol use: Not on file     If you drink alcohol do you typically have >3 drinks per day or >7 drinks per week? No    Alcohol Use 11/3/2021   Prescreen: >3 drinks/day or >7 drinks/week? No   No flowsheet data found.    Reviewed orders with patient.  Reviewed health maintenance and updated orders accordingly - Yes  Lab work is in process    Breast Cancer Screening:  Any new diagnosis of family breast, ovarian, or bowel cancer? No    FHS-7: No flowsheet data found.  click delete button to remove this line now  Mammogram Screening: Recommended mammography every 1-2 years with patient discussion and risk factor consideration  Pertinent mammograms are  "reviewed under the imaging tab.    History of abnormal Pap smear: NO - age 30-65 PAP every 5 years with negative HPV co-testing recommended  PAP / HPV Latest Ref Rng & Units 4/9/2019   PAP Negative for squamous intraepithelial lesion or malignancy. Negative for squamous intraepithelial lesion or malignancy  Electronically signed by Jennifer Dela Cruz CT (ASCP) on 4/15/2019 at  1:57 PM     HPV16 NEG Negative   HPV18 NEG Negative   HRHPV NEG Negative     Reviewed and updated as needed this visit by clinical staff  Tobacco  Allergies  Meds  Problems             Reviewed and updated as needed this visit by Provider    Meds  Problems            No past medical history on file.   Past Surgical History:   Procedure Laterality Date     HC REMOVAL GALLBLADDER      Description: Cholecystectomy;  Recorded: 03/23/2011;  Comments:        Review of Systems  CONSTITUTIONAL: NEGATIVE for fever, chills, change in weight  INTEGUMENTARY/SKIN: NEGATIVE for worrisome rashes, moles or lesions  EYES: NEGATIVE for vision changes or irritation  ENT: NEGATIVE for ear, mouth and throat problems  RESP: NEGATIVE for significant cough or SOB  BREAST: NEGATIVE for masses, tenderness or discharge  CV: NEGATIVE for chest pain, palpitations or peripheral edema  GI: NEGATIVE for nausea, abdominal pain, heartburn, or change in bowel habits  : NEGATIVE for unusual urinary or vaginal symptoms. No vaginal bleeding.  MUSCULOSKELETAL: NEGATIVE for significant arthralgias or myalgia  NEURO: NEGATIVE for weakness, dizziness or paresthesias  PSYCHIATRIC: NEGATIVE for changes in mood or affect      OBJECTIVE:   /84 (BP Location: Left arm, Patient Position: Sitting, Cuff Size: Adult Large)   Pulse 69   Ht 1.6 m (5' 3\")   Wt 106.1 kg (234 lb)   SpO2 100%   BMI 41.45 kg/m    Physical Exam  GENERAL APPEARANCE: healthy, alert and no distress  EYES: Eyes grossly normal to inspection, PERRL and conjunctivae and sclerae normal  HENT: ear canals and " TM's normal, nose and mouth without ulcers or lesions, oropharynx clear and oral mucous membranes moist  NECK: no adenopathy, no asymmetry, masses, or scars and thyroid normal to palpation  RESP: lungs clear to auscultation - no rales, rhonchi or wheezes  BREAST: normal without masses, tenderness or nipple discharge and no palpable axillary masses or adenopathy  CV: regular rate and rhythm, normal S1 S2, no S3 or S4, no murmur, click or rub, no peripheral edema and peripheral pulses strong  ABDOMEN: soft, nontender, no hepatosplenomegaly, no masses and bowel sounds normal  MS: no musculoskeletal defects are noted and gait is age appropriate without ataxia  SKIN: no suspicious lesions or rashes  NEURO: Normal strength and tone, sensory exam grossly normal, mentation intact and speech normal  PSYCH: mentation appears normal and affect normal/bright    Diagnostic Test Results:  Labs reviewed in Epic    ASSESSMENT/PLAN:   Geetha was seen today for physical.    Diagnoses and all orders for this visit:    Routine adult health maintenance  -     TSH with free T4 reflex; Future  We discussed healthy lifestyle, nutrition, cardiovascular risk reduction, self care, safety, sunscreen, and timing of cancer screening.  Health maintenance screening and immunizations reviewed with the patient.  Follow up yearly for the annual physical.    Primary hypertension  -     Lipid Profile (Chol, Trig, HDL, LDL calc); Future  -     Comprehensive metabolic panel (BMP + Alb, Alk Phos, ALT, AST, Total. Bili, TP); Future  -     hydrochlorothiazide (MICROZIDE) 12.5 MG capsule; [HYDROCHLOROTHIAZIDE (MICROZIDE) 12.5 MG CAPSULE] TK 1 C PO D  Follow-up 6 months  Consider wean off with with continued weight loss & normal BP    BMI 40.0-44.9, adult (H)  Lost 35lbs with weight watcher  Motivational interviewing was utilized today.  Modified cognitive behavioral therapy was performed with counseling on internal locus of control.  Discussed importance of  "self care, sleep, nutrition, hydration, exercise, and mindfulness     Chronic pain of right knee  Xray in 2019 showed mild degeneration  She completed PT  Advised weight loss & stretching exercises    Patient has been advised of split billing requirements and indicates understanding: Yes  COUNSELING:  Reviewed preventive health counseling, as reflected in patient instructions    Estimated body mass index is 41.45 kg/m  as calculated from the following:    Height as of this encounter: 1.6 m (5' 3\").    Weight as of this encounter: 106.1 kg (234 lb).    Weight management plan: Discussed healthy diet and exercise guidelines    She reports that she has never smoked. She has never used smokeless tobacco.      Counseling Resources:  ATP IV Guidelines  Pooled Cohorts Equation Calculator  Breast Cancer Risk Calculator  BRCA-Related Cancer Risk Assessment: FHS-7 Tool  FRAX Risk Assessment  ICSI Preventive Guidelines  Dietary Guidelines for Americans, 2010  USDA's MyPlate  ASA Prophylaxis  Lung CA Screening    Tom Mendoza MD  [facility]  "

## 2021-11-10 ENCOUNTER — LAB (OUTPATIENT)
Dept: LAB | Facility: CLINIC | Age: 56
End: 2021-11-10
Payer: COMMERCIAL

## 2021-11-10 DIAGNOSIS — Z00.00 ROUTINE ADULT HEALTH MAINTENANCE: ICD-10-CM

## 2021-11-10 DIAGNOSIS — I10 PRIMARY HYPERTENSION: ICD-10-CM

## 2021-11-10 LAB
ALBUMIN SERPL-MCNC: 3.8 G/DL (ref 3.5–5)
ALP SERPL-CCNC: 94 U/L (ref 45–120)
ALT SERPL W P-5'-P-CCNC: 15 U/L (ref 0–45)
ANION GAP SERPL CALCULATED.3IONS-SCNC: 12 MMOL/L (ref 5–18)
AST SERPL W P-5'-P-CCNC: 15 U/L (ref 0–40)
BILIRUB SERPL-MCNC: 0.4 MG/DL (ref 0–1)
BUN SERPL-MCNC: 16 MG/DL (ref 8–22)
CALCIUM SERPL-MCNC: 10.4 MG/DL (ref 8.5–10.5)
CHLORIDE BLD-SCNC: 104 MMOL/L (ref 98–107)
CHOLEST SERPL-MCNC: 166 MG/DL
CO2 SERPL-SCNC: 26 MMOL/L (ref 22–31)
CREAT SERPL-MCNC: 0.66 MG/DL (ref 0.6–1.1)
FASTING STATUS PATIENT QL REPORTED: YES
GFR SERPL CREATININE-BSD FRML MDRD: >90 ML/MIN/1.73M2
GLUCOSE BLD-MCNC: 83 MG/DL (ref 70–125)
HDLC SERPL-MCNC: 48 MG/DL
LDLC SERPL CALC-MCNC: 105 MG/DL
POTASSIUM BLD-SCNC: 4.1 MMOL/L (ref 3.5–5)
PROT SERPL-MCNC: 7.1 G/DL (ref 6–8)
SODIUM SERPL-SCNC: 142 MMOL/L (ref 136–145)
TRIGL SERPL-MCNC: 65 MG/DL
TSH SERPL DL<=0.005 MIU/L-ACNC: 1.47 UIU/ML (ref 0.3–5)

## 2021-11-10 PROCEDURE — 80061 LIPID PANEL: CPT

## 2021-11-10 PROCEDURE — 80053 COMPREHEN METABOLIC PANEL: CPT

## 2021-11-10 PROCEDURE — 36415 COLL VENOUS BLD VENIPUNCTURE: CPT

## 2021-11-10 PROCEDURE — 84443 ASSAY THYROID STIM HORMONE: CPT

## 2022-05-04 ENCOUNTER — OFFICE VISIT (OUTPATIENT)
Dept: FAMILY MEDICINE | Facility: CLINIC | Age: 57
End: 2022-05-04
Payer: COMMERCIAL

## 2022-05-04 VITALS
SYSTOLIC BLOOD PRESSURE: 130 MMHG | HEART RATE: 76 BPM | BODY MASS INDEX: 37.8 KG/M2 | WEIGHT: 213.4 LBS | DIASTOLIC BLOOD PRESSURE: 80 MMHG

## 2022-05-04 DIAGNOSIS — I10 ESSENTIAL HYPERTENSION: Primary | ICD-10-CM

## 2022-05-04 PROCEDURE — 99213 OFFICE O/P EST LOW 20 MIN: CPT | Performed by: FAMILY MEDICINE

## 2022-05-04 RX ORDER — HYDROCHLOROTHIAZIDE 12.5 MG/1
CAPSULE ORAL
Qty: 90 CAPSULE | Refills: 3 | Status: SHIPPED | OUTPATIENT
Start: 2022-05-04

## 2022-05-04 NOTE — PROGRESS NOTES
ASSESSMENT/PLAN:  Geetha was seen today for recheck and imm/inj.    Diagnoses and all orders for this visit:    Essential hypertension  -     hydrochlorothiazide (MICROZIDE) 12.5 MG capsule; [HYDROCHLOROTHIAZIDE (MICROZIDE) 12.5 MG CAPSULE] TK 1 C PO D  Refilled  Follow-up 6 months    Obesity  Lost 60lbs with weight watcher  Motivational interviewing was utilized today.  Keep up the good work      SUBJECTIVE:    Geetha Connolly is a 57 year old female who came in today     Here for BP check  Still taking hydrochlorothiazide 12.5mg  No chest pain or SOB  Has lost about 60lbs from weight watcher since July 2021    Review of Systems (except those mentioned above)  Constitutional: Negative.   HENT: Negative.   Eyes: Negative.   Respiratory: Negative.   Cardiovascular: Negative.   Gastrointestinal: Negative.   Endocrine: Negative.   Genitourinary: Negative.   Musculoskeletal: Negative.   Skin: Negative.   Allergic/Immunologic: Negative.   Neurological: Negative.   Hematological: Negative.   Psychiatric/Behavioral: Negative.     Patient Active Problem List    Diagnosis Date Noted     Morbid obesity (H) 05/09/2019     Priority: Medium     HTN (hypertension) 04/05/2018     Priority: Medium     No Known Allergies  Current Outpatient Medications   Medication Sig Dispense Refill     Biotin w/ Vitamins C & E (HAIR/SKIN/NAILS PO)        cholecalciferol, vitamin D3, 5,000 unit Tab [CHOLECALCIFEROL, VITAMIN D3, 5,000 UNIT TAB] Take by mouth.       HM CALCIUM/MAGNESIUM/ZINC OR        hydrochlorothiazide (MICROZIDE) 12.5 MG capsule [HYDROCHLOROTHIAZIDE (MICROZIDE) 12.5 MG CAPSULE] TK 1 C PO D 90 capsule 3     Multiple Vitamins-Minerals (DAILY MULTIVITAMIN PO)        Omega-3 Fatty Acids (FISH OIL OMEGA-3 PO)        No past medical history on file.  Past Surgical History:   Procedure Laterality Date     HC REMOVAL GALLBLADDER      Description: Cholecystectomy;  Recorded: 03/23/2011;  Comments:      Social History     Socioeconomic  History     Marital status: Single     Spouse name: None     Number of children: None     Years of education: None     Highest education level: None   Tobacco Use     Smoking status: Never Smoker     Smokeless tobacco: Never Used     Family History   Problem Relation Age of Onset     Breast Cancer Mother 70.00         OBJECTIVE:    Vitals:    05/04/22 1646 05/04/22 1657   BP: (!) 146/90 130/80   BP Location: Left arm    Patient Position: Sitting    Cuff Size: Adult Large    Pulse: 76    Weight: 96.8 kg (213 lb 6.4 oz)      Body mass index is 37.8 kg/m .    Physical Exam:  Constitutional: Patient was oriented to person, place, and time. Patient appeared well-developed and well-nourished. No distress.   Head: Normocephalic and atraumatic.   Right Ear: External ear normal.   Left Ear: External ear normal.   Eyes: Conjunctivae and EOM were normal. Right eye exhibited no discharge. Left eye exhibited no discharge. No scleral icterus.   Cardiovascular: Normal rate, regular rhythm, normal heart sounds and intact distal pulses. No murmur heard.   Pulmonary/Chest: Effort normal and breath sounds normal. No stridor. No respiratory distress. Patient had no wheezes, no rales, exhibits no tenderness.   Neurological: Patient was alert and oriented to person, place, and time. Coordination normal.   Skin: Skin was warm and dry. No rash noted. Patient was not diaphoretic. No erythema. No pallor.       No results found for any visits on 05/04/22.

## 2022-06-06 ENCOUNTER — HOSPITAL ENCOUNTER (OUTPATIENT)
Dept: MAMMOGRAPHY | Facility: CLINIC | Age: 57
Discharge: HOME | End: 2022-06-06
Attending: FAMILY MEDICINE | Admitting: FAMILY MEDICINE
Payer: COMMERCIAL

## 2022-06-06 DIAGNOSIS — Z12.31 VISIT FOR SCREENING MAMMOGRAM: ICD-10-CM

## 2022-06-06 PROCEDURE — 77067 SCR MAMMO BI INCL CAD: CPT

## 2022-06-08 ENCOUNTER — IMMUNIZATION (OUTPATIENT)
Dept: NURSING | Facility: CLINIC | Age: 57
End: 2022-06-08
Payer: COMMERCIAL

## 2022-06-08 PROCEDURE — 91305 COVID-19,PF,PFIZER (12+ YRS): CPT

## 2022-06-08 PROCEDURE — 0054A COVID-19,PF,PFIZER (12+ YRS): CPT

## 2022-06-08 PROCEDURE — 99207 PR DROP WITH A PROCEDURE: CPT | Mod: 25

## 2022-06-21 ENCOUNTER — ANCILLARY PROCEDURE (OUTPATIENT)
Dept: MAMMOGRAPHY | Facility: CLINIC | Age: 57
End: 2022-06-21
Attending: FAMILY MEDICINE
Payer: COMMERCIAL

## 2022-06-21 DIAGNOSIS — N64.89 BREAST ASYMMETRY: ICD-10-CM

## 2022-06-21 PROCEDURE — 77061 BREAST TOMOSYNTHESIS UNI: CPT | Mod: LT

## 2022-08-25 ENCOUNTER — VIRTUAL VISIT (OUTPATIENT)
Dept: FAMILY MEDICINE | Facility: CLINIC | Age: 57
End: 2022-08-25
Payer: COMMERCIAL

## 2022-08-25 DIAGNOSIS — U07.1 INFECTION DUE TO 2019 NOVEL CORONAVIRUS: ICD-10-CM

## 2022-08-25 DIAGNOSIS — E66.01 MORBID OBESITY (H): Primary | ICD-10-CM

## 2022-08-25 DIAGNOSIS — I10 PRIMARY HYPERTENSION: ICD-10-CM

## 2022-08-25 PROCEDURE — 99214 OFFICE O/P EST MOD 30 MIN: CPT | Mod: 95 | Performed by: FAMILY MEDICINE

## 2022-08-25 NOTE — PROGRESS NOTES
Geetha is a 57 year old who is being evaluated via a billable video visit.      How would you like to obtain your AVS? MyChart  If the video visit is dropped, the invitation should be resent by: Text to cell phone: [phone]  Will anyone else be joining your video visit? No      [facility] Video Visit  Phone : none    Assessment/Plan:  Morbid obesity (H)  BMI 35 with HTN- at higher risk for covid 19 complications.  Treatment indicated as below.      Primary hypertension  Well controlled per pt on hydrochlorothiazide 12.5mg daily.  Normal Cr 12/2021 with lab review.  Increased risk of complications from covid 19.  Okay to take hydrochlorothiazide on paxlovid per review of both meds.  Encourage pt to review with her pharmacist as well.      Infection due to 2019 novel coronavirus  Sx started 8/22/22 with mild sx at this point.  Increased risk of complications from covid 19 due to above.  Vaccinated x4 doses.  Pt to stop her supplements while on paxlovid.  Reviewed warning signs and when to present to ED.  Reviewed isolation vs masking recommendations moving forward based on sx start 8/22/22.    - nirmatrelvir and ritonavir (PAXLOVID) therapy pack  Dispense: 30 each; Refill: 0      No follow-ups on file.      Subjective   Geetha is a 57 year old, presenting for the following health issues:  Discuss covid treament  pt struggled to get on the video link- after several minutes of trying and calling pt to walk her through the steps we converted to a phone visit.    Pt's son had a fever last week 8/19/22 and covid negative.  On 8/20/22 he still didn't feel well and passed out- paramedics came in and found that he had a fever and VERY high heart rate.  Required defibrillation, etc and ultimately his covid test was positive.  She took the 2 grandkids for a while and they tested positive for covid this week.  She started to feel off this week 8/22 and 8/23 but was able to work and on 8/24  "she was too dizzy too work and did a covid test that was positive.      No severe symptoms - mild cough and headache. No SOB/Wheezing. No chest pain.  No leg swelling, etc.          HPI     Normal Cr 12/2021    Review of Systems       Objective    Vitals - Patient Reported  Weight (Patient Reported): 90.7 kg (200 lb)  Height (Patient Reported): 160 cm (5' 3\")  BMI (Based on Pt Reported Ht/Wt): 35.43      Vitals:  No vitals were obtained today due to virtual visit.    Physical Exam   Pt has hoarse quality to her voice and occ dry cough but able to talk comfortably.  Engaged in history and planning.            Video-Visit Details    Video Start Time: 2:51 PM    Type of service:  Video Visit    Video End Time:3:16 PM    Originating Location (pt. Location): Home    Distant Location (provider location):  [facility]     Platform used for Video Visit: Ray    .  ..  "

## 2022-10-01 ENCOUNTER — HEALTH MAINTENANCE LETTER (OUTPATIENT)
Age: 57
End: 2022-10-01

## 2022-10-31 ASSESSMENT — ENCOUNTER SYMPTOMS
BREAST MASS: 0
HEMATURIA: 0
DIARRHEA: 0
ABDOMINAL PAIN: 0
DIZZINESS: 0

## 2022-11-07 ENCOUNTER — OFFICE VISIT (OUTPATIENT)
Dept: FAMILY MEDICINE | Facility: CLINIC | Age: 57
End: 2022-11-07
Payer: COMMERCIAL

## 2022-11-07 VITALS
OXYGEN SATURATION: 98 % | DIASTOLIC BLOOD PRESSURE: 76 MMHG | BODY MASS INDEX: 37.32 KG/M2 | HEART RATE: 63 BPM | SYSTOLIC BLOOD PRESSURE: 120 MMHG | WEIGHT: 202.8 LBS | HEIGHT: 62 IN

## 2022-11-07 DIAGNOSIS — I10 ESSENTIAL HYPERTENSION: ICD-10-CM

## 2022-11-07 PROCEDURE — 99213 OFFICE O/P EST LOW 20 MIN: CPT | Performed by: FAMILY MEDICINE

## 2022-11-07 RX ORDER — HYDROCHLOROTHIAZIDE 12.5 MG/1
CAPSULE ORAL
Qty: 90 CAPSULE | Refills: 3 | Status: CANCELLED | OUTPATIENT
Start: 2022-11-07

## 2022-11-07 NOTE — PROGRESS NOTES
"  Assessment & Plan     Essential hypertension  Stable on hydrochlorothiazide 12.5mg  Consider a trial of wean off given stable BP and that she has lost 61 lbs with healthy lifestyle changes   Call if with questions    Tom Mendoza MD  [facility]    Gonzalo Iniguez is a 57 year old, presenting for the following health issues:  Blood Pressure Check      HPI     Hypertension Follow-up      Do you check your blood pressure regularly outside of the clinic? Yes, ocassionally    Are you following a low salt diet? No    Are your blood pressures ever more than 140 on the top number (systolic) OR more   than 90 on the bottom number (diastolic), for example 140/90? Yes, over 140 on the top       How many servings of fruits and vegetables do you eat daily?  4 or more    On average, how many sweetened beverages do you drink each day (Examples: soda, juice, sweet tea, etc.  Do NOT count diet or artificially sweetened beverages)?   1    How many days per week do you exercise enough to make your heart beat faster? 3 or less    How many minutes a day do you exercise enough to make your heart beat faster? 30 - 60    How many days per week do you miss taking your medication? 0          Objective    /76   Pulse 63   Ht 1.579 m (5' 2.17\")   Wt 92 kg (202 lb 12.8 oz)   SpO2 98%   BMI 36.90 kg/m    Body mass index is 36.9 kg/m .  Physical Exam     Constitutional: Patient is oriented to person, place, and time. Patient appears well-developed and well-nourished. No distress.   Head: Normocephalic and atraumatic.   Right Ear: External ear normal.   Left Ear: External ear normal.   Eyes: Conjunctivae and EOM are normal. Right eye exhibits no discharge. Left eye exhibits no discharge. No scleral icterus.   Neurological: Patient is alert and oriented to person, place, and time.  Skin: No rash noted. Patient is not diaphoretic. No erythema. No pallor.  ctab  rrr, no murmur    Answers " for HPI/ROS submitted by the patient on 10/31/2022  abdominal pain: No  Blood in urine: No  diarrhea: No  dizziness: No  pelvic pain: No  vaginal bleeding: No  vaginal discharge: No  tenderness: No  breast mass: No  breast discharge: No

## 2023-02-04 ENCOUNTER — HEALTH MAINTENANCE LETTER (OUTPATIENT)
Age: 58
End: 2023-02-04

## 2023-06-20 ENCOUNTER — HOSPITAL ENCOUNTER (EMERGENCY)
Facility: CLINIC | Age: 58
Discharge: HOME | End: 2023-06-20
Attending: STUDENT IN AN ORGANIZED HEALTH CARE EDUCATION/TRAINING PROGRAM | Admitting: STUDENT IN AN ORGANIZED HEALTH CARE EDUCATION/TRAINING PROGRAM
Payer: COMMERCIAL

## 2023-06-20 VITALS
TEMPERATURE: 98 F | OXYGEN SATURATION: 98 % | RESPIRATION RATE: 15 BRPM | SYSTOLIC BLOOD PRESSURE: 159 MMHG | HEART RATE: 75 BPM | HEIGHT: 63 IN | DIASTOLIC BLOOD PRESSURE: 89 MMHG | BODY MASS INDEX: 37.21 KG/M2 | WEIGHT: 210 LBS

## 2023-06-20 DIAGNOSIS — M54.42 ACUTE LEFT-SIDED LOW BACK PAIN WITH LEFT-SIDED SCIATICA: ICD-10-CM

## 2023-06-20 DIAGNOSIS — M79.662 PAIN OF LEFT LOWER LEG: ICD-10-CM

## 2023-06-20 PROCEDURE — 99284 EMERGENCY DEPT VISIT MOD MDM: CPT

## 2023-06-20 RX ORDER — METHYLPREDNISOLONE 4 MG
TABLET, DOSE PACK ORAL
Qty: 21 TABLET | Refills: 0 | Status: SHIPPED | OUTPATIENT
Start: 2023-06-20

## 2023-06-20 RX ORDER — CYCLOBENZAPRINE HCL 10 MG
10 TABLET ORAL 3 TIMES DAILY PRN
Qty: 18 TABLET | Refills: 0 | Status: SHIPPED | OUTPATIENT
Start: 2023-06-20 | End: 2023-06-26

## 2023-06-20 ASSESSMENT — ACTIVITIES OF DAILY LIVING (ADL): ADLS_ACUITY_SCORE: 33

## 2023-06-20 NOTE — ED PROVIDER NOTES
NAME: Geetha Connolly  AGE: 58 year old female  YOB: 1965  MRN: [id number]  EVALUATION DATE & TIME: No admission date for patient encounter.    PCP: Tom Osei  ED PROVIDER: Cady Hunter MD.    Chief Complaint   Patient presents with     Leg Pain     Left leg     Back Pain       FINAL IMPRESSION:  1. Acute left-sided low back pain with left-sided sciatica    2. Pain of left lower leg        MEDICAL DECISION MAKIN:45 AM I met with the patient, obtained history, performed an initial exam, and discussed options and plan for diagnostics and treatment here in the ED.     58 year old female presents to the Emergency Department for evaluation of left low back pain/hip pain and some burning pain that spreads down her leg but is mostly in her left shin. Dx includes but not limited to muscular strain or spasm, degenerative joint disease, acute herniated disc, sciatica, fracture, epidural abscess or hematoma, discitis, osteomyelitis, malignancy. Given reassuring neuro exam here, no systemic symptoms (afebrile) and no midline tenderness lower suspicion for fracture, infectious spinal etiology, malignancy, cauda equina and do not feel warrants emergent MRI and patient is in agreement. No urinary symptoms or flank pain to suggest stone, UTI/pyelo. Discussed likely low utility of xray (no trauma or deformities) and patient in agreement with not getting this. No signs of infectious skin process or shingles. Exam not suggestive of DVT or ischemic limb. No abdominal pain- doubt referred intraabdominal pathology. Will do trial of flexeril and steroids and have her follow up with the spine center and her primary care doctor. She declined any medications here as she drove here. Strict return precautions discussed and patient is in agreement with plan, endorses understanding and her questions were answered.    Medical Decision Making    History:    Supplemental history from: Documented in chart, if  applicable    External Record(s) reviewed: Documented in chart, if applicable.    Work Up:    Chart documentation includes differential considered and any EKGs or imaging interpreted by provider.    In additional to work up documented, I considered the following work up: Documented in chart, if applicable.    External consultation:    Discussion of management with another provider: Documented in chart, if applicable    Complicating factors:    Care impacted by chronic illness: Hypertension    Care affected by social determinants of health: N/A    Disposition considerations: Discharge. I prescribed additional prescription strength medication(s) as charted. See documentation for any additional details.    MEDICATIONS GIVEN IN THE EMERGENCY:  Medications - No data to display    NEW PRESCRIPTIONS STARTED AT TODAY'S ER VISIT:  New Prescriptions    CYCLOBENZAPRINE (FLEXERIL) 10 MG TABLET    Take 1 tablet (10 mg) by mouth 3 times daily as needed for muscle spasms    METHYLPREDNISOLONE (MEDROL DOSEPAK) 4 MG TABLET THERAPY PACK    Follow Package Directions        =================================================================  HPI    Patient information was obtained from: Patient   Use of : N/A       Geetha Connolly is a 58 year old female with a past medical history of hypertension, who presents with leg pain.    Patient reports lower back/left hip pain that started on Wednesday. She then noticed a progressively worsening burning sensation localized to the shin of her left leg that started to develop on Saturday. Patient has been taking Advil with mild alleviation of pain. Patient denies falls or trauma. She denies any history of back pain. Patient denies fever, abdominal pain, numbness, dysuria, urinary frequency, urinary or fecal incontinence, or additional symptoms or complaints at this time.       REVIEW OF SYSTEMS   All systems reviewed, please see HPI for pertinent findings    PAST MEDICAL HISTORY:  No  "past medical history on file.    PAST SURGICAL HISTORY:  Past Surgical History:   Procedure Laterality Date     HC REMOVAL GALLBLADDER      Description: Cholecystectomy;  Recorded: 03/23/2011;  Comments:        CURRENT MEDICATIONS:    No current facility-administered medications for this encounter.    Current Outpatient Medications:      Biotin w/ Vitamins C & E (HAIR/SKIN/NAILS PO), , Disp: , Rfl:      cholecalciferol, vitamin D3, 5,000 unit Tab, [CHOLECALCIFEROL, VITAMIN D3, 5,000 UNIT TAB] Take by mouth., Disp: , Rfl:      cyclobenzaprine (FLEXERIL) 10 MG tablet, Take 1 tablet (10 mg) by mouth 3 times daily as needed for muscle spasms, Disp: 18 tablet, Rfl: 0     HM CALCIUM/MAGNESIUM/ZINC OR, , Disp: , Rfl:      methylPREDNISolone (MEDROL DOSEPAK) 4 MG tablet therapy pack, Follow Package Directions, Disp: 21 tablet, Rfl: 0     Multiple Vitamins-Minerals (DAILY MULTIVITAMIN PO), , Disp: , Rfl:      Omega-3 Fatty Acids (FISH OIL OMEGA-3 PO), , Disp: , Rfl:      hydrochlorothiazide (MICROZIDE) 12.5 MG capsule, [HYDROCHLOROTHIAZIDE (MICROZIDE) 12.5 MG CAPSULE] TK 1 C PO D, Disp: 90 capsule, Rfl: 3    ALLERGIES:  No Known Allergies    FAMILY HISTORY:  Family History   Problem Relation Age of Onset     Breast Cancer Mother 70     Ovarian Cancer No family hx of        SOCIAL HISTORY:   Social History     Socioeconomic History     Marital status: Single   Tobacco Use     Smoking status: Never     Passive exposure: Never     Smokeless tobacco: Never       PHYSICAL EXAM:    Vitals: BP (!) 174/87   Pulse 82   Temp 98  F (36.7  C) (Oral)   Resp 15   Ht 1.6 m (5' 3\")   Wt 95.3 kg (210 lb)   SpO2 98%   BMI 37.20 kg/m     Constitutional: Well developed, well nourished. No acute distress.  HENT: Normocephalic, atraumatic. Neck-gross ROM intact.   Eyes: Pupils mid-range, sclera white  Respiratory: no respiratory distress, speaks full sentences easily.  Cardiovascular: Normal heart rate. No lower extremity edema. 2+ left DP " and PT pulse. LEs warm and well perfused  GI: Soft, not distended, not tender to palpation  Musculoskeletal: Moving all 4 extremities intentionally and without pain. No obvious deformity. Small scratch to left shin. No erythema, warmth, fluctuance to the left leg. No calf tenderness.  Back: no midline C, T, L spine tenderness or deformities. Some left lower paraspinal/gluteral tenderness without deformity, warmth or swelling.  Skin: Warm, dry  Neurologic: Alert & oriented, speech clear, CNs grossly intact, strength 5/5 and sensation to light touch intact int he LEs    LAB:  All pertinent labs reviewed and interpreted.  Labs Ordered and Resulted from Time of ED Arrival to Time of ED Departure - No data to display    RADIOLOGY:  No orders to display       PROCEDURES:   Procedures     I, Gerald Macario, am serving as a scribe to document services personally performed by Dr. Cady Hunter based on my observation and the provider's statements to me. I, Cady Hunter MD attest that Gerald Macario is acting in a scribe capacity, has observed my performance of the services and has documented them in accordance with my direction.    Cady Hunter M.D.  Emergency Medicine  [facility] EMERGENCY ROOM  [address] [id number] [phone]  Dept: [phone]     Cady Hunter MD  06/20/23 0719

## 2023-06-20 NOTE — DISCHARGE INSTRUCTIONS
You can take 1000 mg of tylenol every 6-8 hours (no more than 4000 mg in 24 hours).  You can take 400 mg of ibuprofen with food every 6-8 hours (no more than 3200 mg in 24 hours).   Can use muscle relaxer as needed for muscle spasm, do not drive or drink after taking this    Please follow up with the spine center and/or your primary care doctor  Return to the Emergency Room with redness/warmth to the leg, numbness/weakness, bowel/bladder changes, fever or other worsening symptoms or concerns

## 2023-06-20 NOTE — Clinical Note
Geetha Connolly was seen and treated in our emergency department on 6/20/2023.  She may return to work on 06/22/2023.       If you have any questions or concerns, please don't hesitate to call.      Turner, Duane H, RN

## 2023-06-20 NOTE — ED TRIAGE NOTES
"Pt reports a 5 day hx of low back pain that radiates down the leg to the front of the shin. Pt describes it as \"Burning, like my leg is on fire\"  Pt states she does do some lifting but denies any injury.      Triage Assessment     Row Name 06/20/23 0537       Triage Assessment (Adult)    Airway WDL WDL       Respiratory WDL    Respiratory WDL WDL       Skin Circulation/Temperature WDL    Skin Circulation/Temperature WDL WDL       Cardiac WDL    Cardiac WDL WDL       Peripheral/Neurovascular WDL    Peripheral Neurovascular WDL neurovascular assessment lower       LLE Neurovascular Assessment    Sensation LLE no numbness;no tenderness;no tingling;other (see comments)  Pt states it feels better when she walks       Cognitive/Neuro/Behavioral WDL    Cognitive/Neuro/Behavioral WDL WDL              "

## 2024-03-03 ENCOUNTER — HEALTH MAINTENANCE LETTER (OUTPATIENT)
Age: 59
End: 2024-03-03

## 2025-03-15 ENCOUNTER — HEALTH MAINTENANCE LETTER (OUTPATIENT)
Age: 60
End: 2025-03-15

## 2025-06-08 ENCOUNTER — HEALTH MAINTENANCE LETTER (OUTPATIENT)
Age: 60
End: 2025-06-08